# Patient Record
Sex: FEMALE | Race: WHITE | Employment: FULL TIME | URBAN - METROPOLITAN AREA
[De-identification: names, ages, dates, MRNs, and addresses within clinical notes are randomized per-mention and may not be internally consistent; named-entity substitution may affect disease eponyms.]

---

## 2019-09-25 ENCOUNTER — OFFICE VISIT (OUTPATIENT)
Dept: URGENT CARE | Facility: CLINIC | Age: 29
End: 2019-09-25
Payer: COMMERCIAL

## 2019-09-25 VITALS
BODY MASS INDEX: 16.1 KG/M2 | OXYGEN SATURATION: 100 % | HEIGHT: 71 IN | TEMPERATURE: 99 F | HEART RATE: 63 BPM | DIASTOLIC BLOOD PRESSURE: 66 MMHG | WEIGHT: 115 LBS | SYSTOLIC BLOOD PRESSURE: 110 MMHG | RESPIRATION RATE: 16 BRPM

## 2019-09-25 DIAGNOSIS — J02.9 SORE THROAT: ICD-10-CM

## 2019-09-25 DIAGNOSIS — J03.90 EXUDATIVE TONSILLITIS: Primary | ICD-10-CM

## 2019-09-25 LAB
CTP QC/QA: YES
S PYO RRNA THROAT QL PROBE: NEGATIVE

## 2019-09-25 PROCEDURE — 99203 OFFICE O/P NEW LOW 30 MIN: CPT | Mod: S$GLB,,, | Performed by: NURSE PRACTITIONER

## 2019-09-25 PROCEDURE — 3008F BODY MASS INDEX DOCD: CPT | Mod: CPTII,S$GLB,, | Performed by: NURSE PRACTITIONER

## 2019-09-25 PROCEDURE — 3008F PR BODY MASS INDEX (BMI) DOCUMENTED: ICD-10-PCS | Mod: CPTII,S$GLB,, | Performed by: NURSE PRACTITIONER

## 2019-09-25 PROCEDURE — 87880 STREP A ASSAY W/OPTIC: CPT | Mod: QW,S$GLB,, | Performed by: NURSE PRACTITIONER

## 2019-09-25 PROCEDURE — 87880 POCT RAPID STREP A: ICD-10-PCS | Mod: QW,S$GLB,, | Performed by: NURSE PRACTITIONER

## 2019-09-25 PROCEDURE — 99203 PR OFFICE/OUTPT VISIT, NEW, LEVL III, 30-44 MIN: ICD-10-PCS | Mod: S$GLB,,, | Performed by: NURSE PRACTITIONER

## 2019-09-25 RX ORDER — AMOXICILLIN 500 MG/1
1000 CAPSULE ORAL DAILY
Qty: 20 CAPSULE | Refills: 0 | Status: SHIPPED | OUTPATIENT
Start: 2019-09-25 | End: 2019-10-05

## 2019-09-25 RX ORDER — PREDNISONE 20 MG/1
20 TABLET ORAL DAILY
Qty: 5 TABLET | Refills: 0 | Status: SHIPPED | OUTPATIENT
Start: 2019-09-25 | End: 2019-09-30

## 2019-09-25 NOTE — PROGRESS NOTES
"Subjective:       Patient ID: Laina Hairston is a 28 y.o. female.    Vitals:  height is 5' 11" (1.803 m) and weight is 52.2 kg (115 lb). Her temperature is 98.5 °F (36.9 °C). Her blood pressure is 110/66 and her pulse is 63. Her respiration is 16 and oxygen saturation is 100%.     Chief Complaint: Sore Throat    Sore throat and white spots on tonsils since yesterday. Ha a h/o frequent strep throat with questionable exposure.     Sore Throat    This is a new problem. The current episode started yesterday. The problem has been unchanged. Neither side of throat is experiencing more pain than the other. There has been no fever. The pain is at a severity of 3/10. The pain is mild. Pertinent negatives include no congestion, coughing, diarrhea, headaches, shortness of breath or vomiting. She has had exposure to strep. She has tried nothing for the symptoms.       Constitution: Negative for chills, fatigue and fever.   HENT: Positive for sore throat. Negative for congestion.    Neck: Negative for painful lymph nodes.   Cardiovascular: Negative for chest pain and leg swelling.   Eyes: Negative for double vision and blurred vision.   Respiratory: Negative for cough and shortness of breath.    Gastrointestinal: Negative for nausea, vomiting and diarrhea.   Genitourinary: Negative for dysuria, frequency, urgency and history of kidney stones.   Musculoskeletal: Negative for joint pain, joint swelling, muscle cramps and muscle ache.   Skin: Negative for color change, pale, rash and bruising.   Allergic/Immunologic: Negative for seasonal allergies.   Neurological: Negative for dizziness, history of vertigo, light-headedness, passing out and headaches.   Hematologic/Lymphatic: Negative for swollen lymph nodes.   Psychiatric/Behavioral: Negative for nervous/anxious, sleep disturbance and depression. The patient is not nervous/anxious.        Objective:      Physical Exam   Constitutional: She is oriented to person, place, and time. " Vital signs are normal. She appears well-developed and well-nourished. She is cooperative.  Non-toxic appearance. She does not appear ill. No distress.   HENT:   Head: Normocephalic and atraumatic.   Right Ear: Hearing, tympanic membrane, external ear and ear canal normal.   Left Ear: Hearing, tympanic membrane, external ear and ear canal normal.   Nose: Nose normal. No mucosal edema, rhinorrhea or nasal deformity. No epistaxis. Right sinus exhibits no maxillary sinus tenderness and no frontal sinus tenderness. Left sinus exhibits no maxillary sinus tenderness and no frontal sinus tenderness.   Mouth/Throat: Uvula is midline and mucous membranes are normal. No trismus in the jaw. Normal dentition. No uvula swelling. Posterior oropharyngeal erythema present. Tonsils are 2+ on the right. Tonsils are 2+ on the left. Tonsillar exudate.       Eyes: Pupils are equal, round, and reactive to light. Conjunctivae, EOM and lids are normal. No scleral icterus.   Sclera clear bilat   Neck: Trachea normal, normal range of motion, full passive range of motion without pain and phonation normal. Neck supple. No spinous process tenderness and no muscular tenderness present. No neck rigidity. Normal range of motion present.   Cardiovascular: Normal rate, regular rhythm, normal heart sounds and normal pulses.   Pulmonary/Chest: Effort normal and breath sounds normal. No respiratory distress.   Abdominal: Soft. Normal appearance and bowel sounds are normal. She exhibits no distension. There is no tenderness.   Musculoskeletal: Normal range of motion. She exhibits no edema or deformity.   Lymphadenopathy:     She has cervical adenopathy.        Right cervical: Superficial cervical adenopathy present.        Left cervical: Superficial cervical adenopathy present.   Worse on the left than right   Neurological: She is alert and oriented to person, place, and time. She exhibits normal muscle tone. Coordination normal.   Skin: Skin is warm,  dry and intact. Capillary refill takes less than 2 seconds. She is not diaphoretic. No pallor.   Psychiatric: She has a normal mood and affect. Her speech is normal and behavior is normal. Judgment and thought content normal. Cognition and memory are normal.   Nursing note and vitals reviewed.      Results for orders placed or performed in visit on 09/25/19   POCT rapid strep A   Result Value Ref Range    Rapid Strep A Screen Negative Negative     Acceptable Yes      Assessment:       1. Exudative tonsillitis    2. Sore throat        Plan:         Exudative tonsillitis  -     amoxicillin (AMOXIL) 500 MG capsule; Take 2 capsules (1,000 mg total) by mouth once daily. for 10 days  Dispense: 20 capsule; Refill: 0  -     predniSONE (DELTASONE) 20 MG tablet; Take 1 tablet (20 mg total) by mouth once daily. for 5 days  Dispense: 5 tablet; Refill: 0    Sore throat  -     POCT rapid strep A  -     predniSONE (DELTASONE) 20 MG tablet; Take 1 tablet (20 mg total) by mouth once daily. for 5 days  Dispense: 5 tablet; Refill: 0          Patient Instructions       Please return here or go to the Emergency Department for any concerns or worsening of condition.  If you were prescribed antibiotics, please take them to completion.  If you were prescribed a narcotic medication, do not drive or operate heavy equipment or machinery while taking these medications.  Please follow up with your primary care doctor or specialist as needed.    If you  smoke, please stop smoking.    Tonsillitis (Child)  Tonsillitis is an inflammation or infection of your child's tonsils. Your child has two tonsils, one on either side of his or her throat. The tonsils are large, oval glands. They help prevent infections. But tonsils can become infected themselves. Tonsillitis is a common childhood condition.    Tonsillitis can be caused by bacteria or a virus. The main symptom is a sore throat. Your child may also have a fever, throat redness or  swelling, or trouble swallowing. The tonsils may also look white, gray, or yellow.  If your child has a bacterial infection, antibiotics may be prescribed. Antibiotics dont work against viral infections. In some cases of a viral infection, an antiviral medication may be prescribed. Once the problem has been treated, your child may need surgery to remove the tonsils if they become infected often or cause breathing problems.  Home care  If your childs health care provider has prescribed antibiotics or another medication, give it to your child as directed. Be sure your child finishes all of the medication, even if he or she feels better.  To help ease your childs sore throat:  · Give acetaminophen or ibuprofen. Follow the package instructions for giving these to a child. (Do not give aspirin to anyone younger than 18 years old who is ill with a fever. It may cause severe liver damage.)  · Offer cool liquids to drink.  · Have your child gargle with warm salt water. An over-the-counter throat-numbing spray may also help.  The germs that cause tonsillitis are very contagious. To help prevent their spread, follow these tips:  · Teach your child to wash his or her hands frequently.  · Dont let your child share cups or utensils with other people.  · Keep your child away from other children until he or she is better.  Follow-up care  Follow up with your child's health care provider, or as advised.  When to seek medical advice  Unless advised otherwise, call your child's healthcare provider if:  · Your child is 3 months old or younger and has a fever of 100.4°F (38°C) or higher. Your child may need to see a healthcare provider.  · Your child is of any age and has fevers higher than 104°F (40°C) that come back again and again.  Also call if any of the following occur:  · Child has a sore throat for more than 2 days  · Child has a sore throat with fever, headache, stomachache, or rash  · Child has neck pain  · Child has a  seizure  · Child is acting strangely  · Child has trouble swallowing or breathing  · Child cant open his or her mouth fully  Date Last Reviewed: 3/22/2015  © 9140-7708 [employer]. [address] [place] [address], [place] [address]. All rights reserved. This information is not intended as a substitute for professional medical care. Always follow your healthcare professional's instructions.        Self-Care for Sore Throats    Sore throats happen for many reasons, such as colds, allergies, and infections caused by viruses or bacteria. In any case, your throat becomes red and sore. Your goal for self-care is to reduce your discomfort while giving your throat a chance to heal.  Moisten and soothe your throat  Tips include the following:  · Try a sip of water first thing after waking up.  · Keep your throat moist by drinking 6 or more glasses of clear liquids every day.  · Run a cool-air humidifier in your room overnight.  · Avoid cigarette smoke.   · Suck on throat lozenges, cough drops, hard candy, ice chips, or frozen fruit-juice bars. Use the sugar-free versions if your diet or medical condition requires them.  Gargle to ease irritation  Gargling every hour or 2 can ease irritation. Try gargling with 1 of these solutions:  · 1/4 teaspoon of salt in 1/2 cup of warm water  · An over-the-counter anesthetic gargle  Use medicine for more relief  Over-the-counter medicine can reduce sore throat symptoms. Ask your pharmacist if you have questions about which medicine to use:  · Ease pain with anesthetic sprays. Aspirin or an aspirin substitute also helps. Remember, never give aspirin to anyone 18 or younger, or if you are already taking blood thinners.   · For sore throats caused by allergies, try antihistamines to block the allergic reaction.  · Remember: unless a sore throat is caused by a bacterial infection, antibiotics wont help you.  Prevent future sore throats  Prevention tips include the following:  · Stop  smoking or reduce contact with secondhand smoke. Smoke irritates the tender throat lining.  · Limit contact with pets and with allergy-causing substances, such as pollen and mold.  · When youre around someone with a sore throat or cold, wash your hands often to keep viruses or bacteria from spreading.  · Dont strain your vocal cords.  Call your healthcare provider  Contact your healthcare provider if you have:  · A temperature over 101°F (38.3°C)  · White spots on the throat  · Great difficulty swallowing  · Trouble breathing  · A skin rash  · Recent exposure to someone else with strep bacteria  · Severe hoarseness and swollen glands in the neck or jaw   Date Last Reviewed: 8/1/2016  © 5471-1540 [employer]. [address] [place] [address], [place] [address]. All rights reserved. This information is not intended as a substitute for professional medical care. Always follow your healthcare professional's instructions.

## 2019-09-25 NOTE — PATIENT INSTRUCTIONS
Please return here or go to the Emergency Department for any concerns or worsening of condition.  If you were prescribed antibiotics, please take them to completion.  If you were prescribed a narcotic medication, do not drive or operate heavy equipment or machinery while taking these medications.  Please follow up with your primary care doctor or specialist as needed.    If you  smoke, please stop smoking.    Tonsillitis (Child)  Tonsillitis is an inflammation or infection of your child's tonsils. Your child has two tonsils, one on either side of his or her throat. The tonsils are large, oval glands. They help prevent infections. But tonsils can become infected themselves. Tonsillitis is a common childhood condition.    Tonsillitis can be caused by bacteria or a virus. The main symptom is a sore throat. Your child may also have a fever, throat redness or swelling, or trouble swallowing. The tonsils may also look white, gray, or yellow.  If your child has a bacterial infection, antibiotics may be prescribed. Antibiotics dont work against viral infections. In some cases of a viral infection, an antiviral medication may be prescribed. Once the problem has been treated, your child may need surgery to remove the tonsils if they become infected often or cause breathing problems.  Home care  If your childs health care provider has prescribed antibiotics or another medication, give it to your child as directed. Be sure your child finishes all of the medication, even if he or she feels better.  To help ease your childs sore throat:  · Give acetaminophen or ibuprofen. Follow the package instructions for giving these to a child. (Do not give aspirin to anyone younger than 18 years old who is ill with a fever. It may cause severe liver damage.)  · Offer cool liquids to drink.  · Have your child gargle with warm salt water. An over-the-counter throat-numbing spray may also help.  The germs that cause tonsillitis are very  contagious. To help prevent their spread, follow these tips:  · Teach your child to wash his or her hands frequently.  · Dont let your child share cups or utensils with other people.  · Keep your child away from other children until he or she is better.  Follow-up care  Follow up with your child's health care provider, or as advised.  When to seek medical advice  Unless advised otherwise, call your child's healthcare provider if:  · Your child is 3 months old or younger and has a fever of 100.4°F (38°C) or higher. Your child may need to see a healthcare provider.  · Your child is of any age and has fevers higher than 104°F (40°C) that come back again and again.  Also call if any of the following occur:  · Child has a sore throat for more than 2 days  · Child has a sore throat with fever, headache, stomachache, or rash  · Child has neck pain  · Child has a seizure  · Child is acting strangely  · Child has trouble swallowing or breathing  · Child cant open his or her mouth fully  Date Last Reviewed: 3/22/2015  © 8499-0781 [employer]. [address] [place] [address]. All rights reserved. This information is not intended as a substitute for professional medical care. Always follow your healthcare professional's instructions.        Self-Care for Sore Throats    Sore throats happen for many reasons, such as colds, allergies, and infections caused by viruses or bacteria. In any case, your throat becomes red and sore. Your goal for self-care is to reduce your discomfort while giving your throat a chance to heal.  Moisten and soothe your throat  Tips include the following:  · Try a sip of water first thing after waking up.  · Keep your throat moist by drinking 6 or more glasses of clear liquids every day.  · Run a cool-air humidifier in your room overnight.  · Avoid cigarette smoke.   · Suck on throat lozenges, cough drops, hard candy, ice chips, or frozen fruit-juice bars. Use the sugar-free  versions if your diet or medical condition requires them.  Gargle to ease irritation  Gargling every hour or 2 can ease irritation. Try gargling with 1 of these solutions:  · 1/4 teaspoon of salt in 1/2 cup of warm water  · An over-the-counter anesthetic gargle  Use medicine for more relief  Over-the-counter medicine can reduce sore throat symptoms. Ask your pharmacist if you have questions about which medicine to use:  · Ease pain with anesthetic sprays. Aspirin or an aspirin substitute also helps. Remember, never give aspirin to anyone 18 or younger, or if you are already taking blood thinners.   · For sore throats caused by allergies, try antihistamines to block the allergic reaction.  · Remember: unless a sore throat is caused by a bacterial infection, antibiotics wont help you.  Prevent future sore throats  Prevention tips include the following:  · Stop smoking or reduce contact with secondhand smoke. Smoke irritates the tender throat lining.  · Limit contact with pets and with allergy-causing substances, such as pollen and mold.  · When youre around someone with a sore throat or cold, wash your hands often to keep viruses or bacteria from spreading.  · Dont strain your vocal cords.  Call your healthcare provider  Contact your healthcare provider if you have:  · A temperature over 101°F (38.3°C)  · White spots on the throat  · Great difficulty swallowing  · Trouble breathing  · A skin rash  · Recent exposure to someone else with strep bacteria  · Severe hoarseness and swollen glands in the neck or jaw   Date Last Reviewed: 8/1/2016  © 1841-8044 [employer]. [address] [place] [address], [place] [address]. All rights reserved. This information is not intended as a substitute for professional medical care. Always follow your healthcare professional's instructions.

## 2019-09-25 NOTE — LETTER
[facility]  Urgent Care  [address] [place] [id number]  Phone: [phone]  Fax: [phone] September 25, 2019    Patient: Laina Hairston   Patient ID [id number]   YOB: 1990   Date of Visit: 9/25/2019       To Whom It May Concern:    Laina Hairston was seen and treated in our Urgent Care Clinic on 9/25/2019. She 09/26/2019 with no restrictions.     Sincerely,       Dedra Reed MA